# Patient Record
Sex: FEMALE | Race: WHITE | Employment: OTHER | URBAN - METROPOLITAN AREA
[De-identification: names, ages, dates, MRNs, and addresses within clinical notes are randomized per-mention and may not be internally consistent; named-entity substitution may affect disease eponyms.]

---

## 2024-12-03 ENCOUNTER — EVALUATION (OUTPATIENT)
Dept: PHYSICAL THERAPY | Age: 79
End: 2024-12-03
Payer: MEDICARE

## 2024-12-03 DIAGNOSIS — R27.0 ATAXIA: Primary | ICD-10-CM

## 2024-12-03 DIAGNOSIS — H81.10 BENIGN PAROXYSMAL POSITIONAL VERTIGO, UNSPECIFIED LATERALITY: ICD-10-CM

## 2024-12-03 PROCEDURE — 97161 PT EVAL LOW COMPLEX 20 MIN: CPT | Performed by: PHYSICAL THERAPIST

## 2024-12-03 PROCEDURE — 97112 NEUROMUSCULAR REEDUCATION: CPT | Performed by: PHYSICAL THERAPIST

## 2024-12-03 NOTE — PROGRESS NOTES
Physical Therapy Daily Treatment Note    Date: 12/3/2024  Patient Name: Demi Iglesias  : 1945   MRN: [id number]  DOInjury: ~ 2 years   DOSx: -  Referring Provider: Dr. Emery Shabazz M.D.    Medical Diagnosis:    Diagnosis Orders   1. Ataxia        2. Benign paroxysmal positional vertigo, unspecified laterality          Outcome Measure:   Dizziness Handicap Inventory (DHI) 18 Mild Handicap     S: See eval  O:  Time 4805-1134     Visit 1     Pain 0/10     ROM      Manual maneuvers      [place] Hallpike R                      L  Log roll R               L negative  negative  negative  negative     Epley (CRM) canalith repositioning maneuver         Exercise      Nustep        Sit/Stands 13/30 sec     TUG test 10 sec     Gait training 2 x 100 feet 2 x 75 feet Unsteady no Loss of balance       NMR Balance activities to aid in safe community and home ambulation    VOR                        vertical                              horizontal  x 10   x 10     Saccades                vertical                              horizontal  x 10   x 10     Smooth Pursuit  x 10 sec     ABCD      Ball pass eyes ball               Eyes outside      Diagonal ball chops            Marching Gait      Sidestepping      Gait with head turns f/e                                  Rot                                    50 feet    50 feet   no dizziness, mild unsteady gait  no dizziness, unsteady gait, mild unsteady gait    Ball press up squat eyes out   eyes on ball      90* turn step up                  A:  Tolerated well.  Above added to written HEP.  P: Continue with rehab plan  Tosha Amos PT    Treatment Charges: Mins Units   Initial Evaluation 20 1   Re-Evaluation     Ther Exercise         TE     Manual Therapy     MT     Ther Activities        TA     Gait Training          GT     Neuro Re-education NR 25 2   Modalities     Non-Billable Service Time 5    Other     Total Time/Units 50 3

## 2024-12-03 NOTE — PROGRESS NOTES
[facility] OUTPATIENT REHABILITATION  PHYSICAL THERAPY INITIAL EVALUATION      Date: 12/3/2024  Patient Name: Demi Iglesias  : 1945   MRN: [id number]  Referring Provider: Dr. Emery Shabazz M.D.     Medical Diagnosis: Ataxia [R27.0]  DOInjury: ~ 2 years   DOSx: -  Treatment Diagnosis:   Diagnosis Orders   1. Ataxia        2. Benign paroxysmal positional vertigo, unspecified laterality            Physician Order: Eval and Treat      Past Medical History:  No past medical history on file.  No past surgical history on file.  Diabetes ~ 40 years    Medications:   No current outpatient medications on file.     No current facility-administered medications for this visit.   3 meds/day per diabetes   Med for hypothyroid  Antacid  Blood pressure med  Cholesterol med  Metamucil  Caltrate   Vit D    Mechanism of Injury/Onset: insidious    Chief Complaint: balance has noticed goes left/right will bump into things. Occasionally will be dizzy when wakes in am    Characteristics: unsteady and dizziness     Current Symptoms:     Positioning:  supine to sit to stand         Motion Intolerance:  none      Other Symptoms: imbalance in the dark      Auditory Symptoms:     Minimal hearing loss    Predisposing Factors: no    Behavior: condition is staying the same    Social history: Patient lives alone   in a ranch home  with 3 steps, without rail  to enter in garage   Tub shower  bathes,  basement laundry    Equipment owned: Cane, Bedside commode, and Shower chair,       Occupation: retired.         Exercise regimen:   spending time with granddaughter  walking in neighborhood     Hobbies: reading, time with granddaughter belongs to 6 clubs active with [employer]      Contraindications/Precautions: none    Imaging results: No results found.    Previous PT: none    Medical Management for Current Problem:  none    Pain:    Current: denies/10    Cervical: Forward Head   [] Normal   []Mild   [] Moderate

## 2024-12-09 ENCOUNTER — TREATMENT (OUTPATIENT)
Dept: PHYSICAL THERAPY | Age: 79
End: 2024-12-09
Payer: MEDICARE

## 2024-12-09 DIAGNOSIS — H81.10 BENIGN PAROXYSMAL POSITIONAL VERTIGO, UNSPECIFIED LATERALITY: ICD-10-CM

## 2024-12-09 DIAGNOSIS — R27.0 ATAXIA: Primary | ICD-10-CM

## 2024-12-09 PROCEDURE — 97112 NEUROMUSCULAR REEDUCATION: CPT

## 2024-12-09 NOTE — PROGRESS NOTES
Physical Therapy Daily Treatment Note    Date: 2024  Patient Name: Demi Iglesias  : 1945   MRN: [id number]  DOInjury: ~ 2 years   DOSx: -  Referring Provider: Dr. Emery Shabazz M.D.    Medical Diagnosis:   1. Ataxia          2. Benign paroxysmal positional vertigo, unspecified laterality              Outcome Measure:   Dizziness Handicap Inventory (DHI) 18 Mild Handicap     S: no changes since eval  O:  Time 7456-3816     Visit -     Pain 0/10     ROM      Manual maneuvers      [place] Hallpike R                      L  Log roll R               L negative  negative  negative  negative     Epley (CRM) canalith repositioning maneuver         Exercise      Nustep        Sit/Stands 13/30 sec     TUG test 10 sec     Gait training 2 x 100 feet 2 x 75 feet Unsteady no Loss of balance       NMR Balance activities to aid in safe community and home ambulation    VOR                        vertical                              horizontal  x 10   x 10     Saccades                vertical                              horizontal  x 10   x 10     Smooth Pursuit  x 10 sec     ABCD      Ball pass eyes ball               Eyes outside      Diagonal ball chops      Tandem standing 5 sec R LE up, 5 sec for L LE     Marching Gait      Sidestepping      Gait with head turns f/e                                  Rot                                  L/F                                  360                                    50 feet    50 feet  50 feet  50 feet   no dizziness, mild unsteady gait  no dizziness, unsteady gait, mild unsteady gait    Ball press up squat eyes out   eyes on ball      90* turn step up            Standing balance EO 3 x 30 sec slight lob,  EC 3 x 30 sec very unsteady Airex    A:  Tolerated well.  Above added to written HEP.  P: Continue with rehab plan  Herlinda Restrepo PTA    Treatment Charges: Mins Units   Initial Evaluation     Re-Evaluation     Ther Exercise         TE     Manual Therapy     MT     Ther

## 2024-12-12 ENCOUNTER — TREATMENT (OUTPATIENT)
Dept: PHYSICAL THERAPY | Age: 79
End: 2024-12-12
Payer: MEDICARE

## 2024-12-12 DIAGNOSIS — H81.10 BENIGN PAROXYSMAL POSITIONAL VERTIGO, UNSPECIFIED LATERALITY: ICD-10-CM

## 2024-12-12 DIAGNOSIS — R27.0 ATAXIA: Primary | ICD-10-CM

## 2024-12-12 PROCEDURE — 97112 NEUROMUSCULAR REEDUCATION: CPT

## 2024-12-12 NOTE — PROGRESS NOTES
Physical Therapy Daily Treatment Note    Date: 2024  Patient Name: Demi Iglesias  : 1945   MRN: [id number]  DOInjury: ~ 2 years   DOSx: -  Referring Provider: Dr. Emery Shabazz M.D.    Medical Diagnosis:   1. Ataxia          2. Benign paroxysmal positional vertigo, unspecified laterality              Outcome Measure:   Dizziness Handicap Inventory (DHI) 18 Mild Handicap     S: feeling good no issues. Balance is off (which it has been)  but no vertigo.  O:  Time 5977-9017     Visit 3/8-16     Pain 0/10     ROM      Manual maneuvers      [place] Hallpike R                      L  Log roll R               L negative  negative  negative  negative     Epley (CRM) canalith repositioning maneuver         Exercise      Nustep        Sit/Stands 13/30 sec     TUG test 10 sec     Gait training 2 x 100 feet 2 x 75 feet Unsteady no Loss of balance       NMR Balance activities to aid in safe community and home ambulation    VOR                        vertical                              horizontal  x 10   x 10     Saccades                vertical                              horizontal  x 10   x 10     Smooth Pursuit  x 10 sec     ABCD      Ball pass eyes ball               Eyes outside      Diagonal ball chops      Tandem standing 5 sec R LE up, 5 sec for L LE     Marching Gait      Sidestepping      Gait with head turns f/e                                  Rot                                  L/F                                  360                                    50 feet    50 feet  50 feet  50 feet   no dizziness, mild unsteady gait  no dizziness, unsteady gait, mild unsteady gait    Ball press up squat eyes out   eyes on ball      90* turn step up            Standing balance EO 3 x 30 sec slight lob,  EC 3 x 30 sec very unsteady Airex    A:  Tolerated well.  Above added to written HEP.  P:   pt will monitor and if additional therapy is needed will call  Herlinda Restrepo PTA    Treatment Charges: Mins Units   Initial